# Patient Record
Sex: MALE | Race: WHITE
[De-identification: names, ages, dates, MRNs, and addresses within clinical notes are randomized per-mention and may not be internally consistent; named-entity substitution may affect disease eponyms.]

---

## 2019-02-21 ENCOUNTER — HOSPITAL ENCOUNTER (EMERGENCY)
Dept: HOSPITAL 56 - MW.ED | Age: 44
Discharge: HOME | End: 2019-02-21
Payer: OTHER GOVERNMENT

## 2019-02-21 DIAGNOSIS — M54.5: Primary | ICD-10-CM

## 2019-02-21 DIAGNOSIS — Z88.0: ICD-10-CM

## 2019-02-21 PROCEDURE — 96372 THER/PROPH/DIAG INJ SC/IM: CPT

## 2019-02-21 PROCEDURE — 99283 EMERGENCY DEPT VISIT LOW MDM: CPT

## 2019-02-21 NOTE — EDM.PDOC
ED HPI GENERAL MEDICAL PROBLEM





- General


Chief Complaint: Back Pain or Injury


Stated Complaint: BACK PAIN


Time Seen by Provider: 02/21/19 12:16


Source of Information: Reports: Patient


History Limitations: Reports: No Limitations





- History of Present Illness


INITIAL COMMENTS - FREE TEXT/NARRATIVE: 





HISTORY AND PHYSICAL:





History of present illness:


Patient is a 44-year-old male here with complaint of right lower back pain. He 

states he felt some muscle spasms in his back yesterday at work and after 

taking a nap he states he woke up with worse pain. He denies any injury or 

trauma. He denies lower extremity weakness, saddle anesthesia, loss of bowel or 

bladder control, fevers or chills. He has not taken anything for his symptoms.





Review of systems: 


As per history of present illness and below otherwise all systems reviewed and 

negative.





Past medical history: 


As per history of present illness and as reviewed below otherwise 

noncontributory.





Surgical history: 


As per history of present illness and as reviewed below otherwise 

noncontributory.





Social history: 


No reported history of drug or alcohol abuse.





Family history: 


As per history of present illness and as reviewed below otherwise 

noncontributory.





Physical exam:


General: Patient sitting comfortably in no acute distress and nontoxic appearing


HEENT: Atraumatic, normocephalic, pupils reactive, negative for conjunctival 

pallor or scleral icterus, mucous membranes moist, throat clear, neck supple, 

nontender, trachea midline. No meningeal signs. 


Lungs: Clear to auscultation, breath sounds equal bilaterally, chest nontender.


Heart: S1S2, regular, negative for clicks, rubs, or overt murmur.


Abdomen: Soft, nondistended, nontender. Negative for masses or 

hepatosplenomegaly. Negative for costovertebral tenderness.


Pelvis: Stable nontender.


Genitourinary: Deferred.


Rectal: Deferred.


Spine: No vertebral tenderness or step-offs to palpation. Right lumbar 

paraspinal tenderness with muscle spasm noted.


Extremities: Atraumatic, negative for cords or calf pain. Neurovascular 

unremarkable.


Neuro: Awake, alert, oriented. Cranial nerves II through XII unremarkable. 

Cerebellum unremarkable. Motor and sensory unremarkable throughout. Exam 

nonfocal.





Notes: 





Diagnostics:


None





Therapeutics:


Toradol 60 mg IM





Prescriptions:


Norflex 100 mg (#12)





Impression: 


Lumbar back pain





Plan:





1. Heat or ice and alternate tylenol and motrin as needed. Take Norflex as 

needed for severe pain ,do not taking while driving as it may make you drowsy


2. Follow up with primary care provider


3. Return to ED as needed as discussed 





Definitive disposition and diagnosis as appropriate pending reevaluation and 

review of above.





  ** Back


Pain Score (Numeric/FACES): 10





- Related Data


 Allergies











Allergy/AdvReac Type Severity Reaction Status Date / Time


 


Penicillins Allergy  Cannot Verified 02/21/19 11:20





   Remember  











Home Meds: 


 Home Meds





Orphenadrine [Norflex] 100 mg PO BID PRN #12 tab 02/21/19 [Rx]











Past Medical History


Neurological History: Reports: Migraines





- Infectious Disease History


Infectious Disease History: Reports: Chicken Pox





Social & Family History





- Family History


Family Medical History: Noncontributory





- Tobacco Use


Smoking Status *Q: Never Smoker





- Recreational Drug Use


Recreational Drug Use: No





ED ROS GENERAL





- Review of Systems


Review Of Systems: ROS reveals no pertinent complaints other than HPI.





ED EXAM,LOWER BACK PAIN/INJURY





- Physical Exam


Exam: See Below (see dictation)





Course





- Vital Signs


Last Recorded V/S: 


 Last Vital Signs











Temp  98.0 F   02/21/19 12:28


 


Pulse  83   02/21/19 12:28


 


Resp  18   02/21/19 12:28


 


BP  127/78   02/21/19 12:28


 


Pulse Ox  95   02/21/19 12:28














- Orders/Labs/Meds


Meds: 


Medications














Discontinued Medications














Generic Name Dose Route Start Last Admin





  Trade Name Freq  PRN Reason Stop Dose Admin


 


Ketorolac Tromethamine  60 mg  02/21/19 11:47  02/21/19 12:00





  Toradol  IM  02/21/19 11:48  60 mg





  ONETIME ONE   Administration





     





     





     





     














Departure





- Departure


Time of Disposition: 12:17


Disposition: Home, Self-Care 01


Condition: Good


Clinical Impression: 


 Lumbar back pain








- Discharge Information


Prescriptions: 


Orphenadrine [Norflex] 100 mg PO BID PRN #12 tab


 PRN Reason: Pain (Severe 7-10)


Instructions:  Back Pain, Adult, Easy-to-Read


Referrals: 


Wilfrido Galindo MD [Primary Care Provider] - 


Forms:  ED Department Discharge


Additional Instructions: 


The following information is given to patients seen in the emergency department 

who are being discharged to home. This information is to outline your options 

for follow-up care. We provide all patients seen in our emergency department 

with a follow-up referral.





The need for follow-up, as well as the timing and circumstances, are variable 

depending upon the specifics of your emergency department visit.





If you don't have a primary care physician on staff, we will provide you with a 

referral. We always advise you to contact your personal physician following an 

emergency department visit to inform them of the circumstance of the visit and 

for follow-up with them and/or the need for any referrals to a consulting 

specialist.





The emergency department will also refer you to a specialist when appropriate. 

This referral assures that you have the opportunity for follow-up care with a 

specialist. All of these measure are taken in an effort to provide you with 

optimal care, which includes your follow-up.





Under all circumstances we always encourage you to contact your private 

physician who remains a resource for coordinating your care. When calling for 

follow-up care, please make the office aware that this follow-up is from your 

recent emergency room visit. If for any reason you are refused follow-up, 

please contact the [facility] Emergency 

Department at [phone] and asked to speak to the emergency department 

charge nurse.





1. Heat or ice and alternate tylenol and motrin as needed. Take Norflex as 

needed for severe pain ,do not taking while driving as it may make you drowsy


2. Follow up with primary care provider


3. Return to ED as needed as discussed

## 2019-12-28 ENCOUNTER — HOSPITAL ENCOUNTER (EMERGENCY)
Dept: HOSPITAL 56 - MW.ED | Age: 44
Discharge: HOME | End: 2019-12-28
Payer: OTHER GOVERNMENT
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
## 2019-12-28 DIAGNOSIS — F17.210: ICD-10-CM

## 2019-12-28 DIAGNOSIS — S62.114A: Primary | ICD-10-CM
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
## 2019-12-28 DIAGNOSIS — Z88.0: ICD-10-CM

## 2019-12-28 PROCEDURE — 96372 THER/PROPH/DIAG INJ SC/IM: CPT

## 2019-12-28 PROCEDURE — 73110 X-RAY EXAM OF WRIST: CPT

## 2019-12-28 PROCEDURE — 99283 EMERGENCY DEPT VISIT LOW MDM: CPT

## 2019-12-28 NOTE — CR
Indication:



Right wrist pain.



Technique:



Three views of the right wrist.



Comparison:



None



Findings:



On the lateral view, a questionable triquetral fracture is identified. The 

joint spaces are relatively well maintained. No other definite fracture is 

identified. 



Impression:



Questionable triquetral fracture



Dictated by Alyce Martinez MD @ Dec 28 2019 10:37AM



Signed by Dr. Alyce Martinez @ Dec 28 2019 10:38AM

## 2019-12-28 NOTE — EDM.PDOC
ED HPI GENERAL MEDICAL PROBLEM





- General


Chief Complaint: Upper Extremity Injury/Pain


Stated Complaint: JAMMED RT HAND


Time Seen by Provider: 12/28/19 09:55


Source of Information: Reports: Patient


History Limitations: Reports: No Limitations





- History of Present Illness


INITIAL COMMENTS - FREE TEXT/NARRATIVE: 





Dense reporting right wrist pain. The patient states that he jammed his right 

hand couple of weeks ago. He has been working in the interim. Then about 36 

hours ago he was working repetitively with a pipe wrench. Starting last night 

he noticed a lot of pain in his right wrist so much so that he could not move 

the wrist.  He is otherwise asymptomatic without fever. Otherwise healthy 

without chronic medical problems.


  ** right hand/wrist


Pain Score (Numeric/FACES): 8





- Related Data


 Allergies











Allergy/AdvReac Type Severity Reaction Status Date / Time


 


Penicillins Allergy  Cannot Verified 02/21/19 11:20





   Remember  











Home Meds: 


 Home Meds





Diclofenac Sodium [Voltaren] 75 mg PO BIDMEALS PRN #20 tab.ec 12/28/19 [Rx]











Past Medical History


Musculoskeletal History: Reports: Fracture


Neurological History: Reports: Migraines





- Infectious Disease History


Infectious Disease History: Reports: Chicken Pox





- Past Surgical History


Musculoskeletal Surgical History: Reports: Shoulder Replacement


Other Musculoskeletal Surgeries/Procedures:: ankle surgery





Social & Family History





- Family History


Family Medical History: Noncontributory





- Tobacco Use


Smoking Status *Q: Current Every Day Smoker


Years of Tobacco use: 30


Packs/Tins Daily: 1





- Recreational Drug Use


Recreational Drug Use: No





Review of Systems





- Review of Systems


Review Of Systems: Comprehensive ROS is negative, except as noted in HPI.





ED EXAM, GENERAL





- Physical Exam


Exam: See Below


Exam Limited By: No Limitations


General Appearance: Alert, No Apparent Distress


Ears: Normal External Exam


Nose: Normal Inspection


Throat/Mouth: Normal Inspection


Head: Atraumatic, Normocephalic


Neck: Normal Inspection


Respiratory/Chest: No Respiratory Distress


Cardiovascular: Regular Rate, Rhythm


Back Exam: Normal Inspection


Extremities: Normal Inspection, Other (Right hand and wrist very mildly swollen 

without erythema or calor, deformity, crepitus.  Motion limited by pain.  

Tenderness over the dorsal hand and wrist.)


Neurological: Alert, Oriented


Psychiatric: Normal Affect, Normal Mood


Skin Exam: Warm, Dry, Intact, Normal Color, No Rash





Course





- Vital Signs


Last Recorded V/S: 


 Last Vital Signs











Temp  36.3 C   12/28/19 09:53


 


Pulse  83   12/28/19 09:53


 


Resp  16   12/28/19 09:53


 


BP  118/85   12/28/19 09:53


 


Pulse Ox  94 L  12/28/19 09:53














- Orders/Labs/Meds


Orders: 


 Active Orders 24 hr











 Category Date Time Status


 


 DME for Discharge [COMM] Stat Oth  12/28/19 11:30 Ordered











Meds: 


Medications














Discontinued Medications














Generic Name Dose Route Start Last Admin





  Trade Name Freq  PRN Reason Stop Dose Admin


 


Ketorolac Tromethamine  60 mg  12/28/19 11:31  





  Toradol  IM  12/28/19 11:32  





  ONETIME ONE   





     





     





     





     














Departure





- Departure


Time of Disposition: 11:34


Disposition: Home, Self-Care 01


Condition: Good


Clinical Impression: 


 Tendonitis





Triquetral fracture


Qualifiers:


 Encounter type: initial encounter Fracture type: closed Fracture alignment: 

nondisplaced Laterality: right Qualified Code(s): S62.114A - Nondisplaced 

fracture of triquetrum [cuneiform] bone, right wrist, initial encounter for 

closed fracture








- Discharge Information


Prescriptions: 


Diclofenac Sodium [Voltaren] 75 mg PO BIDMEALS PRN #20 tab.ec


 PRN Reason: Pain


Referrals: 


Mikey Gibson NP [Primary Care Provider] - 


[facility]]


Forms:  ED Department Discharge


Additional Instructions: 


The following information is given to patients seen in the emergency department 

who are being discharged to home. This information is to outline your options 

for follow-up care. We provide all patients seen in our emergency department 

with a follow-up referral.





The need for follow-up, as well as the timing and circumstances, are variable 

depending upon the specifics of your emergency department visit.





If you don't have a primary care physician on staff, we will provide you with a 

referral. We always advise you to contact your personal physician following an 

emergency department visit to inform them of the circumstance of the visit and 

for follow-up with them and/or the need for any referrals to a consulting 

specialist.





The emergency department will also refer you to a specialist when appropriate. 

This referral assures that you have the opportunity for follow-up care with a 

specialist. All of these measure are taken in an effort to provide you with 

optimal care, which includes your follow-up.





Under all circumstances we always encourage you to contact your private 

physician who remains a resource for coordinating your care. When calling for 

follow-up care, please make the office aware that this follow-up is from your 

recent emergency room visit. If for any reason you are refused follow-up, 

please contact the [facility] Emergency 

Department at [phone] and asked to speak to the emergency department 

charge nurse.





1.  Wear splint


2.  Diclofenac twice daily for inflammation


3.  Report indicated a questionable fracture. It is likely you have tendinitis 

due to repetitive motion with the pipe wrench.  In either case splinting and 

anti-inflammatories are appropriate.


4.  Follow up in orthopedics for definitive management





Sepsis Event Note





- Evaluation


Sepsis Screening Result: No Definite Risk





- Focused Exam


Vital Signs: 


 Vital Signs











  Temp Pulse Resp BP Pulse Ox


 


 12/28/19 09:53  36.3 C  83  16  118/85  94 L











Date Exam was Performed: 12/28/19


Time Exam was Performed: 11:34





- My Orders


Last 24 Hours: 


My Active Orders





12/28/19 11:30


DME for Discharge [COMM] Stat 














- Assessment/Plan


Last 24 Hours: 


My Active Orders





12/28/19 11:30


DME for Discharge [COMM] Stat

## 2022-02-16 ENCOUNTER — HOSPITAL ENCOUNTER (OUTPATIENT)
Dept: HOSPITAL 56 - MW.SDS | Age: 47
Discharge: HOME | End: 2022-02-16
Attending: SURGERY
Payer: OTHER GOVERNMENT

## 2022-02-16 DIAGNOSIS — Z98.890: ICD-10-CM

## 2022-02-16 DIAGNOSIS — K21.00: ICD-10-CM

## 2022-02-16 DIAGNOSIS — I78.1: ICD-10-CM

## 2022-02-16 DIAGNOSIS — G43.909: ICD-10-CM

## 2022-02-16 DIAGNOSIS — K52.9: Primary | ICD-10-CM

## 2022-02-16 DIAGNOSIS — K22.89: ICD-10-CM

## 2022-02-16 DIAGNOSIS — K29.70: ICD-10-CM

## 2022-02-16 DIAGNOSIS — K57.30: ICD-10-CM

## 2022-02-16 DIAGNOSIS — K31.89: ICD-10-CM

## 2022-02-16 DIAGNOSIS — F17.220: ICD-10-CM

## 2022-02-16 DIAGNOSIS — Z88.0: ICD-10-CM

## 2022-02-16 DIAGNOSIS — Z79.899: ICD-10-CM
